# Patient Record
Sex: MALE | URBAN - METROPOLITAN AREA
[De-identification: names, ages, dates, MRNs, and addresses within clinical notes are randomized per-mention and may not be internally consistent; named-entity substitution may affect disease eponyms.]

---

## 2019-06-13 ENCOUNTER — OFFICE VISIT (OUTPATIENT)
Dept: INFECTIOUS DISEASES | Facility: CLINIC | Age: 54
End: 2019-06-13
Payer: COMMERCIAL

## 2019-06-13 VITALS
SYSTOLIC BLOOD PRESSURE: 120 MMHG | BODY MASS INDEX: 22.28 KG/M2 | WEIGHT: 147 LBS | DIASTOLIC BLOOD PRESSURE: 78 MMHG | RESPIRATION RATE: 17 BRPM | HEART RATE: 74 BPM | HEIGHT: 68 IN | TEMPERATURE: 97.5 F

## 2019-06-13 DIAGNOSIS — Z20.5 EXPOSURE TO HEPATITIS A: Primary | ICD-10-CM

## 2019-06-13 PROCEDURE — 90471 IMMUNIZATION ADMIN: CPT | Performed by: INTERNAL MEDICINE

## 2019-06-13 PROCEDURE — 99212 OFFICE O/P EST SF 10 MIN: CPT | Performed by: INTERNAL MEDICINE

## 2019-06-13 PROCEDURE — 90632 HEPA VACCINE ADULT IM: CPT | Performed by: INTERNAL MEDICINE

## 2019-06-13 RX ORDER — SACCHAROMYCES BOULARDII 250 MG
250 CAPSULE ORAL 2 TIMES DAILY
COMMUNITY

## 2024-06-25 ENCOUNTER — OFFICE VISIT (OUTPATIENT)
Dept: OBGYN CLINIC | Facility: CLINIC | Age: 59
End: 2024-06-25
Payer: COMMERCIAL

## 2024-06-25 VITALS — WEIGHT: 146 LBS | HEIGHT: 68 IN | BODY MASS INDEX: 22.13 KG/M2

## 2024-06-25 DIAGNOSIS — S86.311A TEAR OF PERONEAL TENDON OF RIGHT FOOT: ICD-10-CM

## 2024-06-25 DIAGNOSIS — S93.491A SPRAIN OF ANTERIOR TALOFIBULAR LIGAMENT OF RIGHT ANKLE, INITIAL ENCOUNTER: Primary | ICD-10-CM

## 2024-06-25 DIAGNOSIS — M21.6X1 ACQUIRED RIGHT HINDFOOT VARUS: ICD-10-CM

## 2024-06-25 PROCEDURE — 99203 OFFICE O/P NEW LOW 30 MIN: CPT | Performed by: ORTHOPAEDIC SURGERY

## 2024-06-25 NOTE — PROGRESS NOTES
James R Lachman, M.D.  Attending, Orthopaedic Surgery  Foot and Ankle  [facility]        ORTHOPAEDIC FOOT AND ANKLE CLINIC VISIT     Assessment:     Encounter Diagnoses   Name Primary?    Sprain of anterior talofibular ligament of right ankle, initial encounter Yes    Split tear of peroneus brevis tendon of right foot     Acquired right hindfoot varus               Plan:   The patient verbalized understanding of exam findings and treatment plan. We engaged in the shared decision-making process and treatment options were discussed at length with the patient. Surgical and conservative management discussed today along with risks and benefits.  Patient had and ankle sprain in the fall which he rehabbed successfully but had another injury recently 1 month ago.   On examination he has significant right ankle weakness when compared to the contralateral side. This is in all planes, not just eversion.  He has not yet returned to PT after this most recent injury. A referral was placed today for him to restart formal PT/HEP to strength his right ankle.  He also has an MRI finding of a split tear of his peroneus brevis tendon with associated tenderness in this area on examination. He does have hindfoot varus which would predispose him to this condition with his peroneal tendons. He was provided with information on how to obtain the proper orthotics from [employer] to wear in supportive shoe wear   A surgical procedure of a Cavovarus correction with Malerba and Dorsiflexion 1st Ray, peroneus brevis repair could be considered if he develops every day walking pain that interferes with his daily activities and non operative treatments do not provide him with benefit.   Return in about 8 weeks (around 8/20/2024).      History of Present Illness:   Chief Complaint:   Chief Complaint   Patient presents with    Right Ankle - Pain     Has a tear in the ankle a full and a partial tear. Happened full about a  month ago.      Murtaza Marie is a 58 y.o. male who is being seen for right ankle pain. Patient reports that he has had 2 significant ankle sprains in the past year, most recent being 1 month ago.  Pain is localized at posterolateral ankle with minimal radiating and described as sharp and severe. Patient denies numbness, tingling or radicular pain.  Denies history of neuropathy.  Patient does not smoke, does not have diabetes and does not take blood thinners.  Patient denies family history of anesthesia complications and has not had any complications with anesthesia.     Pain/symptom timing:  Worse during the day when active  Pain/symptom context:  Worse with activites and work  Pain/symptom modifying factors:  Rest makes better, activities make worse  Pain/symptom associated signs/symptoms: none    Prior treatment   NSAIDsYes    Injections No   Bracing/Orthotics No   Physical Therapy No     Orthopedic Surgical History:   See below    Past Medical, Surgical and Social History:  Past Medical History:  has no past medical history on file.  Problem List: does not have a problem list on file.  Past Surgical History:  has no past surgical history on file.  Family History: family history is not on file.  Social History:  reports that he has never smoked. He has never used smokeless tobacco. He reports that he does not drink alcohol and does not use drugs.  Current Medications: has a current medication list which includes the following prescription(s): saccharomyces boulardii.  Allergies: has No Known Allergies.     Review of Systems:  General- denies fever/chills  HEENT- denies hearing loss or sore throat  Eyes- denies eye pain or visual disturbances, denies red eyes  Respiratory- denies cough or SOB  Cardio- denies chest pain or palpitations  GI- denies abdominal pain  Endocrine- denies urinary frequency  Urinary- denies pain with urination  Musculoskeletal- Negative except noted above  Skin- denies rashes or  "wounds  Neurological- denies dizziness or headache  Psychiatric- denies anxiety or difficulty concentrating    Physical Exam:   Ht 5' 8\" (1.727 m)   Wt 66.2 kg (146 lb)   BMI 22.20 kg/m²   General/Constitutional: No apparent distress: well-nourished and well developed.  Eyes: normal ocular motion  Cardio: RRR, Normal S1S2, No m/r/g  Lymphatic: No appreciable lymphadenopathy  Respiratory: Non-labored breathing, CTA b/l no w/c/r  Vascular: No edema, swelling or tenderness, except as noted in detailed exam.  Integumentary: No impressive skin lesions present, except as noted in detailed exam.  Neuro: No ataxia or tremors noted  Psych: Normal mood and affect, oriented to person, place and time. Appropriate affect.  Musculoskeletal: Normal, except as noted in detailed exam and in HPI.    Examination    Right    Gait Normal   Musculoskeletal Mild tender to palpation at peroneal tendons.  Hindfoot varus deformity noted    Skin Normal.      Nails Normal    Range of Motion  20 degrees dorsiflexion, 30 degrees plantarflexion  Subtalar motion: normal    Stability Stable    Muscle Strength 5/5 tibialis anterior  5/5 gastrocnemius-soleus  4/5 posterior tibialis  4/5 peroneal/eversion strength  4/5 EHL  5/5 FHL    Neurologic Normal    Sensation Intact to light touch throughout sural, saphenous, superficial peroneal, deep peroneal and medial/lateral plantar nerve distributions.  [place]-Bhavani 5.07 filament (10g) testing deferred.    Cardiovascular Brisk capillary refill < 2 seconds,intact DP and PT pulses    Special Tests None      Imaging Studies:   MRI of the right ankle was reviewed and interpreted independently that demonstrate split tear of the peroneus brevis tendon. Evidence of prior ankle sprains (Tear of ATFL tendon). Reviewed by me personally.        James R. Lachman, MD  Foot & Ankle Surgery   Department of Orthopaedic Surgery  [facility]      I personally performed the service.    Todd CONDON" Lachman, MD    Scribe Attestation      I,:  Alvaro Lorenzo am acting as a scribe while in the presence of the attending physician.:       I,:  James R Lachman, MD personally performed the services described in this documentation    as scribed in my presence.:

## 2024-06-25 NOTE — PATIENT INSTRUCTIONS
[employer]->[employer]-High arches-click filters of Max thickness-Men-Memory Foam. Pick between  and